# Patient Record
(demographics unavailable — no encounter records)

---

## 2025-01-27 NOTE — DISCUSSION/SUMMARY
[FreeTextEntry1] : well visit Coccyx and knee pain Rule out musculoskeletal Would recommend orthopedic evaluation for imaging  no hx diverticular disease   Stool heme in  office  neg for  blood CT ABD pelvis not completed Patient states mammogram Pap smear up-to-date with management  GYN 1 year follow-up Declined flu vaccine  f/u GI Dr Garcia as  needed

## 2025-01-27 NOTE — PROCEDURE
[FreeTextEntry1] : Blood draw  EKG January 27, 2025 1 normal sinus rhythm Normal cardiac intervals Bone density January 27, 2025 AP spine L1-L4 normal Left femoral neck normal Total left hip normal Overall impression normal bone density No evidence of osteopenia osteoporosis Follow-up 2 days 5 years There are some very nonspecific changes no evidence for prior cardiac event Chest x-ray PA lateral 11/2/2023 Status post abdominal pain Cardiac size is normal No free air under the diaphragm Lung fields are clear No parenchymal infiltrates pleural effusions or dominant pulmonary nodule Soft tissue bony structures unremarkable No pneumothorax   UA 10/23/23 trace intact blood  EKG 6/7/21  NSR DEXA 6/7 /21  Normal study
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
## 2025-01-27 NOTE — PHYSICAL EXAM
[No Acute Distress] : no acute distress [Normal Oropharynx] : normal oropharynx [Normal Appearance] : normal appearance [Supple] : supple [No JVD] : no jvd [Normal Rate/Rhythm] : normal rate/rhythm [Normal Pulses] : normal pulses [Normal S1, S2] : normal s1, s2 [No Murmurs] : no murmurs [No Resp Distress] : no resp distress [Clear to Auscultation Bilaterally] : clear to auscultation bilaterally [Benign] : benign [Normal Gait] : normal gait [No Clubbing] : no clubbing [No Cyanosis] : no cyanosis [No Edema] : no edema [TextBox_125] : Right thigh following dermatome raised vesicular lesions most consistent with herpes zoster

## 2025-02-21 NOTE — PHYSICAL EXAM
[de-identified] :  Lumbar Physical Exam   Gait - Normal   Station - Normal   Sagittal balance - Normal   Compensatory mechanism? - None   Heel walk - Normal   Toe walk - Normal   Reflexes Patellar - normal Gastroc - normal Clonus - No   Hip Exam - Normal   Straight leg raise - none   Pulses - 2+ dp/pt   Range of motion - normal   Sensation Sensation intact to light touch in L1, L2, L3, L4, L5 and S1 dermatomes bilaterally   Motor IP Quad HS TA Gastroc EHL Right 5/5 5/5 5/5 5/5 5/5 5/5 Left 5/5 5/5 5/5 5/5 5/5 5/5 [de-identified] : lumbar rads mild facet arthropathy disc height well maintained sacrococcygeal alignment appears benign

## 2025-02-21 NOTE — ADDENDUM
[FreeTextEntry1] :  I, Dede Magana, acted solely as a scribe for Dr. Rashaad Luis MD on this date 02/21/2025    All medical record entries made by the Scribe were at my, Dr. Rashaad Luis MD., direction and personally dictated by me on 02/21/2025 . I have reviewed the chart and agree that the record accurately reflects my personal performance of the history, physical exam, assessment and plan. I have also personally directed, reviewed, and agreed with the chart.

## 2025-02-21 NOTE — ASSESSMENT
[FreeTextEntry1] :  I had a lengthy discussion with the patient in regards to treatment plan and diagnosis. There are no red flag findings on imaging nor are there any red flag findings on clinical exams.  Therefore we will proceed with a course of conservative treatment. I prescribed a wheelchair cushion. She can take Meloxicam and Tylenol for pain relief. The patient will follow up with me in approximately 3 to 4 weeks.  I encouraged the patient to follow-up sooner if there are any new or worsening symptoms.
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
